# Patient Record
Sex: MALE | Race: OTHER | HISPANIC OR LATINO
[De-identification: names, ages, dates, MRNs, and addresses within clinical notes are randomized per-mention and may not be internally consistent; named-entity substitution may affect disease eponyms.]

---

## 2022-07-26 PROBLEM — Z00.00 ENCOUNTER FOR PREVENTIVE HEALTH EXAMINATION: Status: ACTIVE | Noted: 2022-07-26

## 2022-08-01 ENCOUNTER — APPOINTMENT (OUTPATIENT)
Dept: GASTROENTEROLOGY | Facility: CLINIC | Age: 37
End: 2022-08-01

## 2022-08-01 PROCEDURE — 91200 LIVER ELASTOGRAPHY: CPT

## 2022-09-26 ENCOUNTER — APPOINTMENT (OUTPATIENT)
Dept: GASTROENTEROLOGY | Facility: CLINIC | Age: 37
End: 2022-09-26

## 2022-09-26 VITALS
TEMPERATURE: 98.2 F | BODY MASS INDEX: 28.93 KG/M2 | WEIGHT: 180 LBS | HEART RATE: 78 BPM | DIASTOLIC BLOOD PRESSURE: 82 MMHG | OXYGEN SATURATION: 98 % | RESPIRATION RATE: 14 BRPM | HEIGHT: 66 IN | SYSTOLIC BLOOD PRESSURE: 114 MMHG

## 2022-09-26 DIAGNOSIS — B20 HUMAN IMMUNODEFICIENCY VIRUS [HIV] DISEASE: ICD-10-CM

## 2022-09-26 DIAGNOSIS — Z83.3 FAMILY HISTORY OF DIABETES MELLITUS: ICD-10-CM

## 2022-09-26 DIAGNOSIS — Z78.9 OTHER SPECIFIED HEALTH STATUS: ICD-10-CM

## 2022-09-26 DIAGNOSIS — K76.0 FATTY (CHANGE OF) LIVER, NOT ELSEWHERE CLASSIFIED: ICD-10-CM

## 2022-09-26 PROCEDURE — 99205 OFFICE O/P NEW HI 60 MIN: CPT

## 2022-09-26 PROCEDURE — 99215 OFFICE O/P EST HI 40 MIN: CPT

## 2022-09-26 RX ORDER — BICTEGRAVIR SODIUM, EMTRICITABINE, AND TENOFOVIR ALAFENAMIDE FUMARATE 30; 120; 15 MG/1; MG/1; MG/1
30-120-15 TABLET ORAL DAILY
Qty: 30 | Refills: 0 | Status: ACTIVE | COMMUNITY
Start: 2022-09-26

## 2022-09-26 NOTE — HISTORY OF PRESENT ILLNESS
[de-identified] : EULALIO TIAN is a 37 year old male with a PMH significant for HIV (diagnosed in 2016). He presents today for elevated liver enzymes.\par \par Pt reports he has had elevated liver enzymes for many years and told he had fatty liver which has been followed by his PCP but in the last few months has been more elevated than usual. Lab data from May 2022 to now reveal elevated transaminases. AST ranging 60-90, ALT ranging 160-230. \par \par Denies alcohol consumption. Denies recent infection. Denies OTC or herbal supplements. Denies any new medications. BMI is 29. Pt was diagnosed with HIV in 2016 when he fell ill with meningitis and was hospitalized. He was started on Biktarvy 4-5 years ago which predates recent uptick in transaminases. Denies heart disease, DM, HLD, and autoimmune disease. Denies family history of liver disease, sudden death or late trimester miscarriages.\par \par Pt under went through with thorough work up with [facility].\par Labs from this year - negative for hereditary hemochromatosis, Ceruloplasmin negative, A1A negative. BAYRON, AMA, and ASMA negative. Hepatitis A antibody positive but no viral load indicating resolved infection. Hepatitis E antibody positive but no viral load indicating resolved infection. Hepatitis B and C negative. CMV IgG positive but IgM negative. EBV IgG positive but IgM negative. \par \par 5/23/22 - bilirubin 0.9, AST 66, , AlkPhos 77\par 7/11/22 - bilirubin 0.9, AST 83, , AlkPhos 88\par 7/29/22 - bilirubin 0.8, AST 91, , AlkPhos 90 \par 8/30/22 - bilirubin 1.0, AST 63, , AlkPhos 80, INR 1.0, \par 9/18/22 - bilirubin 0.7, AST 79, , AlkPhos 85\par \par US Abdomen 5/23/22 - mild hepatomegaly (18.1cm) with diffuse fatty infiltration of the liver\par FibroScan 8/1/22 - F2 fibrosis, S3 steatosis >67%\par \par Denies fatigue, malaise, arthralgias, myalgias, pruritus, recent infection, abdominal pain or distension, jaundice, hematemesis, hematochezia, dark urine, confusion, unintentional weight loss or gain.

## 2022-09-26 NOTE — ADDENDUM
[FreeTextEntry1] : I, Marie Jacobs NP, acted as scribe for KIRA Aguillon for this patient encounter.

## 2022-09-26 NOTE — ASSESSMENT
[FreeTextEntry1] : EULALIO TIAN is a 37 year old male with a PMH significant for HIV (diagnosed in 2016). He presents today for elevated liver enzymes.\par \par Most likely nonalcoholic fatty liver disease.\par Labs reviewed with pt in detail. Lab work ruled out competing etiologies of liver disease.\par Etiology of fatty liver disease explained to pt in detail along with complications of disease progression.\par \par Recommend lifestyle modifications in form of diet and exercise.\par These changes have been shown to lead to regression or even resolution of steatosis, inflammation, and even fibrosis in some patients.\par High protein, low fat, low sugar, low salt (up to 2G/day) diet\par Gradual weight loss of 7-10% of current body weight.\par Increase physical activity.\par Copy of Mediterranean diet given.\par \par Possibly Drug Induced Liver Injury however less likely since pt has been on Biktarvy for 4-5 years. Elevation would have occurred closer to the start of the medication.\par \par If liver enzymes remain elevated despite lifestyle modifications, will consider Liver Biopsy.\par Follow up in 9 months to 1 year with labs and imaging or sooner if needed.

## 2022-09-26 NOTE — PHYSICAL EXAM
[Non-Tender] : non-tender [Smooth] : smooth [General Appearance - Alert] : alert [General Appearance - In No Acute Distress] : in no acute distress [Sclera] : the sclera and conjunctiva were normal [Outer Ear] : the ears and nose were normal in appearance [Oropharynx] : the oropharynx was normal [Neck Appearance] : the appearance of the neck was normal [Auscultation Breath Sounds / Voice Sounds] : lungs were clear to auscultation bilaterally [Heart Rate And Rhythm] : heart rate was normal and rhythm regular [Edema] : there was no peripheral edema [Bowel Sounds] : normal bowel sounds [Abdomen Soft] : soft [Abdomen Tenderness] : non-tender [Abdomen Mass (___ Cm)] : no abdominal mass palpated [Abnormal Walk] : normal gait [Nail Clubbing] : no clubbing  or cyanosis of the fingernails [Musculoskeletal - Swelling] : no joint swelling seen [Motor Tone] : muscle strength and tone were normal [Skin Color & Pigmentation] : normal skin color and pigmentation [Skin Turgor] : normal skin turgor [] : no rash [No Focal Deficits] : no focal deficits [Oriented To Time, Place, And Person] : oriented to person, place, and time [Impaired Insight] : insight and judgment were intact [Affect] : the affect was normal [Scleral Icterus] : No Scleral Icterus [Hepatojugular Reflux] : patient did not have a sustained hepatojugular reflux [Spider Angioma] : No spider angioma(s) were observed [Abdominal  Ascites] : no ascites [Splenomegaly] : no splenomegaly [Scrotal Edema] : Scrotum is not edematous [Asterixis] : no asterixis observed [Jaundice] : No jaundice [Palmar Erythema] : no Palmar Erythema [Depression] : no depression [Hallucinations] : ~T no ~M hallucinations

## 2023-03-27 ENCOUNTER — APPOINTMENT (OUTPATIENT)
Dept: GASTROENTEROLOGY | Facility: CLINIC | Age: 38
End: 2023-03-27

## 2025-03-17 ENCOUNTER — NON-APPOINTMENT (OUTPATIENT)
Age: 40
End: 2025-03-17